# Patient Record
Sex: FEMALE | Race: WHITE | NOT HISPANIC OR LATINO
[De-identification: names, ages, dates, MRNs, and addresses within clinical notes are randomized per-mention and may not be internally consistent; named-entity substitution may affect disease eponyms.]

---

## 2018-06-27 PROBLEM — Z00.00 ENCOUNTER FOR PREVENTIVE HEALTH EXAMINATION: Status: ACTIVE | Noted: 2018-06-27

## 2018-08-22 ENCOUNTER — APPOINTMENT (OUTPATIENT)
Dept: OBGYN | Facility: CLINIC | Age: 48
End: 2018-08-22
Payer: COMMERCIAL

## 2018-08-22 VITALS
DIASTOLIC BLOOD PRESSURE: 54 MMHG | HEIGHT: 66 IN | SYSTOLIC BLOOD PRESSURE: 92 MMHG | WEIGHT: 180 LBS | BODY MASS INDEX: 28.93 KG/M2

## 2018-08-22 LAB
BILIRUB UR QL STRIP: NORMAL
GLUCOSE UR-MCNC: NORMAL
HCG UR QL: 0.2 EU/DL
HGB UR QL STRIP.AUTO: NORMAL
KETONES UR-MCNC: NORMAL
LEUKOCYTE ESTERASE UR QL STRIP: NORMAL
NITRITE UR QL STRIP: NORMAL
PH UR STRIP: 5.5
PROT UR STRIP-MCNC: NORMAL
SP GR UR STRIP: 1.03

## 2018-08-22 PROCEDURE — 81003 URINALYSIS AUTO W/O SCOPE: CPT | Mod: QW

## 2018-08-22 PROCEDURE — 99213 OFFICE O/P EST LOW 20 MIN: CPT

## 2018-08-24 ENCOUNTER — OTHER (OUTPATIENT)
Age: 48
End: 2018-08-24

## 2018-08-24 LAB — BACTERIA UR CULT: NORMAL

## 2018-08-29 ENCOUNTER — RX CHANGE (OUTPATIENT)
Age: 48
End: 2018-08-29

## 2018-08-29 ENCOUNTER — OTHER (OUTPATIENT)
Age: 48
End: 2018-08-29

## 2018-08-29 RX ORDER — ESTRADIOL 0.1 MG/G
0.1 CREAM VAGINAL
Qty: 1 | Refills: 3 | Status: DISCONTINUED | COMMUNITY
Start: 2018-08-29 | End: 2018-08-29

## 2018-08-29 RX ORDER — ESTRADIOL 0.1 MG/G
0.1 CREAM VAGINAL
Qty: 1 | Refills: 3 | Status: DISCONTINUED | COMMUNITY
Start: 2018-08-24 | End: 2018-08-29

## 2018-12-11 ENCOUNTER — RX RENEWAL (OUTPATIENT)
Age: 48
End: 2018-12-11

## 2019-01-29 ENCOUNTER — NON-APPOINTMENT (OUTPATIENT)
Age: 49
End: 2019-01-29

## 2019-01-29 ENCOUNTER — APPOINTMENT (OUTPATIENT)
Dept: CARDIOLOGY | Facility: CLINIC | Age: 49
End: 2019-01-29
Payer: COMMERCIAL

## 2019-01-29 VITALS
BODY MASS INDEX: 27.62 KG/M2 | SYSTOLIC BLOOD PRESSURE: 126 MMHG | DIASTOLIC BLOOD PRESSURE: 80 MMHG | WEIGHT: 176 LBS | HEART RATE: 76 BPM | HEIGHT: 67 IN

## 2019-01-29 DIAGNOSIS — Z80.8 FAMILY HISTORY OF MALIGNANT NEOPLASM OF OTHER ORGANS OR SYSTEMS: ICD-10-CM

## 2019-01-29 DIAGNOSIS — Z82.3 FAMILY HISTORY OF STROKE: ICD-10-CM

## 2019-01-29 DIAGNOSIS — Z82.49 FAMILY HISTORY OF ISCHEMIC HEART DISEASE AND OTHER DISEASES OF THE CIRCULATORY SYSTEM: ICD-10-CM

## 2019-01-29 DIAGNOSIS — Z83.42 FAMILY HISTORY OF FAMILIAL HYPERCHOLESTEROLEMIA: ICD-10-CM

## 2019-01-29 DIAGNOSIS — Z83.438 FAMILY HISTORY OF OTHER DISORDER OF LIPOPROTEIN METABOLISM AND OTHER LIPIDEMIA: ICD-10-CM

## 2019-01-29 DIAGNOSIS — Z78.9 OTHER SPECIFIED HEALTH STATUS: ICD-10-CM

## 2019-01-29 DIAGNOSIS — Z83.3 FAMILY HISTORY OF DIABETES MELLITUS: ICD-10-CM

## 2019-01-29 DIAGNOSIS — Z56.0 UNEMPLOYMENT, UNSPECIFIED: ICD-10-CM

## 2019-01-29 PROCEDURE — 99205 OFFICE O/P NEW HI 60 MIN: CPT

## 2019-01-29 PROCEDURE — 93000 ELECTROCARDIOGRAM COMPLETE: CPT

## 2019-01-29 RX ORDER — ZOLPIDEM TARTRATE 12.5 MG/1
12.5 TABLET, COATED ORAL
Refills: 0 | Status: ACTIVE | COMMUNITY

## 2019-01-29 RX ORDER — ESTRADIOL 10 UG/1
10 TABLET VAGINAL
Qty: 27 | Refills: 3 | Status: DISCONTINUED | COMMUNITY
Start: 2018-08-29 | End: 2019-01-29

## 2019-01-29 RX ORDER — ESCITALOPRAM OXALATE 20 MG/1
20 TABLET, FILM COATED ORAL DAILY
Refills: 0 | Status: ACTIVE | COMMUNITY

## 2019-01-29 SDOH — ECONOMIC STABILITY - INCOME SECURITY: UNEMPLOYMENT, UNSPECIFIED: Z56.0

## 2019-01-29 NOTE — REASON FOR VISIT
[FreeTextEntry1] : Mrs. Islas is a 49 year old female that came in today 1-29-19 for cardiac consultation as was referred by her PCP. \par She saw her PCP for a routine f/u and was complaining of malaise and ACP. EKg was done and due to an abnormal reading she was referred for consultation. \par Upon review of systems she makes note of intermittent 5-6/10 left sided chest soarness, lasting 1-2minutes, without radiation. Can come with exertion or rest. This has been ongoing x 1 year. 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 At times she can feel the atypical chest discomfort with walking, but it subsides as she continues to walk. \par She also c/o mild sob on exertion x 1 year. \par She denies palpitations, dizziness, syncope, orthopnea and PND. \par \par She has seen Dr. Hart years ago (2014) for atypical chest pain (unclear if the same). She states that she had stress testing and was normal (see below). \par \par She denies any h/o CAD, MI, CHF, TIA, CVA or DM. \par \par Her history includes dyslipidemia, family history (father-CABG/MI alive). \par \par Labs/tests\par \par Carotid ultrasound 12-2-11 minimal atherosclerotic disease in the LOBO\par \par Echocardiogram 7-18-14 EF 60% with normal ventricular function, septal bounce, trace MR/AI and trace to mild TR\par \par Stress echocardiogram 12-14-11 patient exercised 8 minutes and 46 seconds reaching 87% of MPHR no evidence of exercise-induced ischemia by EKG and grossly negative for exercise-induced ischemia by echo imaging (endocardium is not well visualized)\par \par EKG today 1-29-19 NSR with possible old anteroseptal wall MI, IVCD, LAD, extreme LAHB, prp and nsstt (similar to prior EKG's).

## 2019-01-29 NOTE — PHYSICAL EXAM
[Normal Appearance] : normal appearance [Heart Rate And Rhythm] : heart rate and rhythm were normal [Heart Sounds] : normal S1 and S2 [Murmurs] : no murmurs present [] : no respiratory distress [Respiration, Rhythm And Depth] : normal respiratory rhythm and effort [Bowel Sounds] : normal bowel sounds [Abdomen Soft] : soft [Abnormal Walk] : normal gait [Skin Color & Pigmentation] : normal skin color and pigmentation [Affect] : the affect was normal [Mood] : the mood was normal [FreeTextEntry1] : no edema 2+ b/l l/e distal pulse

## 2019-01-29 NOTE — ASSESSMENT
[FreeTextEntry1] : PLAN 1-29-19\par \par --Atypical chest pain and shortness of breath with CAD risk factors of family history, hyperlipidemia, overweight, abnormal EKG (seems similar to past ekg) and age. Would recommend a lexsican (given abnormal ekg) nuclear stress test for further ischemic evaluation. We have also recommended an echocardiogram to further evaluate EF, ventricular wall motion, valvular function, chamber size and valvular pathology. Red flag symptoms that would warrant urgent evaluation was reviewed with the patient. She is currenlty stable\par \par -Hyperlipidemia. Requesting most recent lipid panel from PCP\par \par We will follow up the patient after recommended testing has been completed sooner if any changes in symptoms or status\par \par Sincerely,\par Dr. Stein\par scribed by Jody Pollard PA-C\par \par

## 2019-01-29 NOTE — REVIEW OF SYSTEMS
[Shortness Of Breath] : shortness of breath [Chest Pain] : chest pain [Palpitations] : no palpitations [Dizziness] : no dizziness

## 2019-02-12 ENCOUNTER — APPOINTMENT (OUTPATIENT)
Dept: CARDIOLOGY | Facility: CLINIC | Age: 49
End: 2019-02-12
Payer: COMMERCIAL

## 2019-02-12 PROCEDURE — 93306 TTE W/DOPPLER COMPLETE: CPT

## 2019-02-18 ENCOUNTER — APPOINTMENT (OUTPATIENT)
Dept: CARDIOLOGY | Facility: CLINIC | Age: 49
End: 2019-02-18

## 2019-03-11 ENCOUNTER — APPOINTMENT (OUTPATIENT)
Dept: CARDIOLOGY | Facility: CLINIC | Age: 49
End: 2019-03-11
Payer: COMMERCIAL

## 2019-03-11 PROCEDURE — A9502: CPT

## 2019-03-11 PROCEDURE — 93015 CV STRESS TEST SUPVJ I&R: CPT

## 2019-03-11 PROCEDURE — 78452 HT MUSCLE IMAGE SPECT MULT: CPT

## 2019-03-13 ENCOUNTER — APPOINTMENT (OUTPATIENT)
Dept: OBGYN | Facility: CLINIC | Age: 49
End: 2019-03-13
Payer: COMMERCIAL

## 2019-03-13 VITALS
WEIGHT: 165 LBS | HEIGHT: 67 IN | SYSTOLIC BLOOD PRESSURE: 100 MMHG | BODY MASS INDEX: 25.9 KG/M2 | DIASTOLIC BLOOD PRESSURE: 70 MMHG

## 2019-03-13 PROCEDURE — 99396 PREV VISIT EST AGE 40-64: CPT

## 2019-03-13 NOTE — DISCUSSION/SUMMARY
[FreeTextEntry1] : a49 years old white female came to the office for annual exam patient is not sure if she started having her periods a year ago or less than a year started to have bleeding the last two-month regular. We'll send the patient for hormonal testing to assess menopause physical and pelvic exams are within normal limits Pap smear done I spent 35 minutes with pH

## 2019-03-17 LAB — HPV HIGH+LOW RISK DNA PNL CVX: NOT DETECTED

## 2019-03-18 LAB — CYTOLOGY CVX/VAG DOC THIN PREP: NORMAL

## 2019-03-19 ENCOUNTER — APPOINTMENT (OUTPATIENT)
Dept: CARDIOLOGY | Facility: CLINIC | Age: 49
End: 2019-03-19
Payer: COMMERCIAL

## 2019-03-19 VITALS
SYSTOLIC BLOOD PRESSURE: 112 MMHG | WEIGHT: 165 LBS | HEART RATE: 83 BPM | BODY MASS INDEX: 25.84 KG/M2 | DIASTOLIC BLOOD PRESSURE: 78 MMHG | OXYGEN SATURATION: 96 %

## 2019-03-19 PROCEDURE — 99214 OFFICE O/P EST MOD 30 MIN: CPT

## 2019-03-19 NOTE — PHYSICAL EXAM
[Normal Appearance] : normal appearance [] : no respiratory distress [Respiration, Rhythm And Depth] : normal respiratory rhythm and effort [Heart Rate And Rhythm] : heart rate and rhythm were normal [Heart Sounds] : normal S1 and S2 [Murmurs] : no murmurs present [Bowel Sounds] : normal bowel sounds [Abdomen Soft] : soft [Abnormal Walk] : normal gait [Skin Color & Pigmentation] : normal skin color and pigmentation [Affect] : the affect was normal [Mood] : the mood was normal [FreeTextEntry1] : no bruits

## 2019-03-19 NOTE — ASSESSMENT
[FreeTextEntry1] : PLAN 3-19-19\par \par --Atypical chest pain and shortness of breath with CAD risk factors of family history, hyperlipidemia, overweight, abnormal EKG (seems similar to past ekg) and age.  Nuclear stress test revealed LVEF 43%, and possible small infarct, with apical hypokinesis. There was enlarged LV size. \par \par Echocardiogram revealed normal  EF, and ventricular wall motion, valvular function, chamber size and borderline MVP, mild MR, TR.  Red flag symptoms that would warrant urgent evaluation was reviewed with the patient.\par \par Recommend CTA coronaries given continued symptoms and abnormal stress testing, and risk factors. \par \par -Hyperlipidemia. LDL in 180's. She elects for lifestyle modification. Recheck in 3 months, and re-evaluate after CTA to determine need for statins. \par \par We will follow up the patient after  testing has been completed sooner if any changes in symptoms or status\par \par

## 2019-03-19 NOTE — REASON FOR VISIT
[FreeTextEntry1] : Mrs. Islas is a 49 year old female that came in for cardiac consultation as was referred by her PCP. \par She saw her PCP for a routine f/u and was complaining of malaise and ACP. EKG was done and due to an abnormal reading she was referred for consultation. \par Upon review of systems she makes note of intermittent 5-6/10 left sided chest soarness, lasting 1-2 minutes, without radiation. Can come with exertion or rest. This has been ongoing x 1 year. 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 At times she can feel the atypical chest discomfort with walking, but it subsides as she continues to walk. \par She also c/o mild sob on exertion x 1 year. \par She denies palpitations, dizziness, syncope, orthopnea and PND. \par \par She has seen Dr. Hart years ago (2014) for atypical chest pain (unclear if the same). She states that she had stress testing and was normal (see below). \par \par She denies any h/o CAD, MI, CHF, TIA, CVA or DM. \par \par Her history includes dyslipidemia, family history (father-CABG/MI alive). \par \par Echocardiography October 12, 2019 revealed EF of 60%, there are no segmental wall motion abnormalities, there is mild TR and mild MR with borderline bileaflet mitral valve prolapse.\par \par Nuclear stress test performed on March 11, 2019 revealed no evidence of ischemia however there was a small defect that was fixed suggestive of scar, with an LVEF calculated at 43% and apical hypokinesis. There is enlarged LV size.\par \par \par Labs/tests\par \par Carotid ultrasound 12-2-11 minimal atherosclerotic disease in the LOBO\par \par Echocardiogram 7-18-14 EF 60% with normal ventricular function, septal bounce, trace MR/AI and trace to mild TR\par \par Stress echocardiogram 12-14-11 patient exercised 8 minutes and 46 seconds reaching 87% of MPHR no evidence of exercise-induced ischemia by EKG and grossly negative for exercise-induced ischemia by echo imaging (endocardium is not well visualized)\par \par EKG  1-29-19 NSR with possible old anteroseptal wall MI, IVCD, LAD, extreme LAHB, prp and nsstt (similar to prior EKG's).

## 2019-04-16 ENCOUNTER — ASOB RESULT (OUTPATIENT)
Age: 49
End: 2019-04-16

## 2019-04-16 ENCOUNTER — APPOINTMENT (OUTPATIENT)
Dept: OBGYN | Facility: CLINIC | Age: 49
End: 2019-04-16
Payer: COMMERCIAL

## 2019-04-16 PROCEDURE — 76830 TRANSVAGINAL US NON-OB: CPT

## 2019-04-16 PROCEDURE — 76857 US EXAM PELVIC LIMITED: CPT | Mod: 59

## 2019-04-20 ENCOUNTER — APPOINTMENT (OUTPATIENT)
Dept: MAMMOGRAPHY | Facility: CLINIC | Age: 49
End: 2019-04-20
Payer: COMMERCIAL

## 2019-04-20 PROCEDURE — 77067 SCR MAMMO BI INCL CAD: CPT

## 2019-04-20 PROCEDURE — 77063 BREAST TOMOSYNTHESIS BI: CPT

## 2021-03-13 ENCOUNTER — APPOINTMENT (OUTPATIENT)
Dept: MAMMOGRAPHY | Facility: CLINIC | Age: 51
End: 2021-03-13
Payer: COMMERCIAL

## 2021-03-13 PROCEDURE — 77063 BREAST TOMOSYNTHESIS BI: CPT

## 2021-03-13 PROCEDURE — 77067 SCR MAMMO BI INCL CAD: CPT

## 2021-11-07 ENCOUNTER — EMERGENCY (EMERGENCY)
Facility: HOSPITAL | Age: 51
LOS: 1 days | End: 2021-11-07
Admitting: EMERGENCY MEDICINE
Payer: COMMERCIAL

## 2021-11-07 PROCEDURE — 99053 MED SERV 10PM-8AM 24 HR FAC: CPT

## 2021-11-07 PROCEDURE — 74176 CT ABD & PELVIS W/O CONTRAST: CPT | Mod: 26

## 2021-11-07 PROCEDURE — 99285 EMERGENCY DEPT VISIT HI MDM: CPT

## 2022-01-09 ENCOUNTER — RESULT CHARGE (OUTPATIENT)
Age: 52
End: 2022-01-09

## 2022-01-10 ENCOUNTER — APPOINTMENT (OUTPATIENT)
Dept: CARDIOLOGY | Facility: CLINIC | Age: 52
End: 2022-01-10
Payer: COMMERCIAL

## 2022-01-10 ENCOUNTER — NON-APPOINTMENT (OUTPATIENT)
Age: 52
End: 2022-01-10

## 2022-01-10 VITALS
HEIGHT: 67 IN | WEIGHT: 178 LBS | SYSTOLIC BLOOD PRESSURE: 128 MMHG | HEART RATE: 74 BPM | BODY MASS INDEX: 27.94 KG/M2 | DIASTOLIC BLOOD PRESSURE: 80 MMHG | OXYGEN SATURATION: 99 % | TEMPERATURE: 96.8 F

## 2022-01-10 DIAGNOSIS — Z87.898 PERSONAL HISTORY OF OTHER SPECIFIED CONDITIONS: ICD-10-CM

## 2022-01-10 DIAGNOSIS — N95.2 POSTMENOPAUSAL ATROPHIC VAGINITIS: ICD-10-CM

## 2022-01-10 DIAGNOSIS — Z87.42 PERSONAL HISTORY OF OTHER DISEASES OF THE FEMALE GENITAL TRACT: ICD-10-CM

## 2022-01-10 DIAGNOSIS — Z86.39 PERSONAL HISTORY OF OTHER ENDOCRINE, NUTRITIONAL AND METABOLIC DISEASE: ICD-10-CM

## 2022-01-10 PROCEDURE — 93000 ELECTROCARDIOGRAM COMPLETE: CPT

## 2022-01-10 PROCEDURE — 99215 OFFICE O/P EST HI 40 MIN: CPT

## 2022-01-10 RX ORDER — ALPRAZOLAM 0.25 MG/1
0.25 TABLET ORAL
Refills: 0 | Status: ACTIVE | COMMUNITY

## 2022-01-17 ENCOUNTER — APPOINTMENT (OUTPATIENT)
Dept: CARDIOLOGY | Facility: CLINIC | Age: 52
End: 2022-01-17

## 2022-01-21 ENCOUNTER — APPOINTMENT (OUTPATIENT)
Dept: CARDIOLOGY | Facility: CLINIC | Age: 52
End: 2022-01-21
Payer: COMMERCIAL

## 2022-01-21 ENCOUNTER — NON-APPOINTMENT (OUTPATIENT)
Age: 52
End: 2022-01-21

## 2022-01-21 PROCEDURE — 93306 TTE W/DOPPLER COMPLETE: CPT

## 2022-02-01 ENCOUNTER — APPOINTMENT (OUTPATIENT)
Dept: CARDIOLOGY | Facility: CLINIC | Age: 52
End: 2022-02-01
Payer: COMMERCIAL

## 2022-02-01 VITALS
BODY MASS INDEX: 27.88 KG/M2 | DIASTOLIC BLOOD PRESSURE: 64 MMHG | HEART RATE: 66 BPM | OXYGEN SATURATION: 96 % | WEIGHT: 178 LBS | SYSTOLIC BLOOD PRESSURE: 100 MMHG | TEMPERATURE: 98.1 F

## 2022-02-01 DIAGNOSIS — R07.89 OTHER CHEST PAIN: ICD-10-CM

## 2022-02-01 DIAGNOSIS — F41.9 ANXIETY DISORDER, UNSPECIFIED: ICD-10-CM

## 2022-02-01 DIAGNOSIS — R94.31 ABNORMAL ELECTROCARDIOGRAM [ECG] [EKG]: ICD-10-CM

## 2022-02-01 DIAGNOSIS — E78.5 HYPERLIPIDEMIA, UNSPECIFIED: ICD-10-CM

## 2022-02-01 DIAGNOSIS — R73.03 PREDIABETES.: ICD-10-CM

## 2022-02-01 DIAGNOSIS — N20.0 CALCULUS OF KIDNEY: ICD-10-CM

## 2022-02-01 DIAGNOSIS — R94.39 ABNORMAL RESULT OF OTHER CARDIOVASCULAR FUNCTION STUDY: ICD-10-CM

## 2022-02-01 DIAGNOSIS — F32.A DEPRESSION, UNSPECIFIED: ICD-10-CM

## 2022-02-01 PROCEDURE — 99215 OFFICE O/P EST HI 40 MIN: CPT

## 2022-02-01 RX ORDER — ROSUVASTATIN CALCIUM 20 MG/1
20 TABLET, FILM COATED ORAL DAILY
Qty: 90 | Refills: 3 | Status: ACTIVE | COMMUNITY
Start: 2022-02-01 | End: 1900-01-01

## 2022-02-01 RX ORDER — FENOFIBRATE 145 MG/1
145 TABLET, COATED ORAL DAILY
Qty: 90 | Refills: 3 | Status: ACTIVE | COMMUNITY
Start: 2022-02-01 | End: 1900-01-01

## 2022-02-14 ENCOUNTER — APPOINTMENT (OUTPATIENT)
Dept: OPHTHALMOLOGY | Facility: CLINIC | Age: 52
End: 2022-02-14

## 2022-03-17 ENCOUNTER — APPOINTMENT (OUTPATIENT)
Dept: MAMMOGRAPHY | Facility: CLINIC | Age: 52
End: 2022-03-17
Payer: COMMERCIAL

## 2022-03-17 ENCOUNTER — APPOINTMENT (OUTPATIENT)
Dept: RADIOLOGY | Facility: CLINIC | Age: 52
End: 2022-03-17

## 2022-03-17 PROCEDURE — 77067 SCR MAMMO BI INCL CAD: CPT

## 2022-03-17 PROCEDURE — 77063 BREAST TOMOSYNTHESIS BI: CPT

## 2022-03-17 PROCEDURE — 77080 DXA BONE DENSITY AXIAL: CPT

## 2022-05-24 ENCOUNTER — APPOINTMENT (OUTPATIENT)
Dept: CARDIOLOGY | Facility: CLINIC | Age: 52
End: 2022-05-24